# Patient Record
(demographics unavailable — no encounter records)

---

## 2025-02-17 NOTE — PHYSICAL EXAM
[Restricted in physically strenuous activity but ambulatory and able to carry out work of a light or sedentary nature] : Status 1- Restricted in physically strenuous activity but ambulatory and able to carry out work of a light or sedentary nature, e.g., light house work, office work [Normal] : affect appropriate [de-identified] : + scleral icterus b/l

## 2025-02-17 NOTE — PHYSICAL EXAM
[Restricted in physically strenuous activity but ambulatory and able to carry out work of a light or sedentary nature] : Status 1- Restricted in physically strenuous activity but ambulatory and able to carry out work of a light or sedentary nature, e.g., light house work, office work [Normal] : affect appropriate [de-identified] : + scleral icterus b/l

## 2025-02-17 NOTE — HISTORY OF PRESENT ILLNESS
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   Kathleen presented to [place] ED 1/22/24 c/o generalized weakness, chest heaviness, jaundice and orange urine for about a week. Labs significant for hyperbilirubinemia, transaminitis. CT AP showed numerous new low-attenuation liver lesions most compatible with metastatic disease. Intrahepatic and extra hepatic biliary ductal dilatation to the level of the ampulla where there is abrupt tapering, as well as, pancreatic ductal dilatation up to the level of the ampulla. Distended gallbladder with pericholecystic edema. Pattern of biliary ductal dilatation raises concern for ampullary or biliary mass or malignant stricture in the setting of liver metastases. Enlarged retroperitoneal lymph node just below the takeoff of the SMA and adjacent to the third portion of the duodenum measuring up to 2.2 x 1.7 cm, presumably metastatic. Case reviewed with GI, patient discharged with plans to have EUS/ERCP outpatient. Procedure performed 1/30/25 which showed malignant appearing ampullary mass with biliary obstruction. Mass was biopsied, metal stent placed. Final pathology showed ampullary mass moderately to poorly differentiated adenocarcinoma w/ intestinal, pancreaticobiliary and neuroendocrine differentiation. Liver mass biopsy positive for adenocarcinoma mucinous, papillary and signet ring cell features. 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   Referred to Medical Oncology for initial consultation.   Social: lives with her , daughter and two sons. Has 8 kids total (3 in [facility], rest here). 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  She has been following with various doctors closely. Has been c/o L sided abdominal pain for years. Had a CT A/P with co in 2022 and then had another one 1 yr ago but was a contrast study.      [de-identified] : mod-poorly differentiated adeno intestinal, pancreaticobiliary, and NET differentiation [de-identified] : her 2 neg  MMR intact Liver bx: adeno with mucinous, papillary, signet ring cell features CPS 4  [FreeTextEntry1] : initial visit  [de-identified] : Here with her daughter, Claudia.  Has had abdominal pain for years. Per report, all work up has been negative. Worse postprandial. Does not take PPI.  Was in [facility] end of last year. Developed low appetite, lethargic. When she came back from [facility], felt like her heart was racing. Saw Cardiologist, CXR done showing PNA? Treated with abx x 4 days. Developed puritus, called dermatologist. Dermatologist noticed eyes were jaundice. Sent her to ED.  C/o blurry vision since day after ERCP, 1/31/25.  Lost ~5 lbs since January.  Intermittent nausea, no vomiting  Bowels are normal.  chronic neuropathy worse in feet, mild in hands. does not fall, occasionally unsteady if moves too fast + LE edema since the hospital, unchanged.  lives with , 8 kids, 4 boys, 4 girls, 25 grandkids   EGD 12/15/24

## 2025-02-17 NOTE — REVIEW OF SYSTEMS
[Fatigue] : fatigue [Recent Change In Weight] : ~T recent weight change [Chest Pain] : chest pain [Lower Ext Edema] : lower extremity edema [Abdominal Pain] : abdominal pain [Diarrhea: Grade 0] : Diarrhea: Grade 0 [Negative] : Allergic/Immunologic [FreeTextEntry3] : icteric sclera  [FreeTextEntry7] : + nausea [de-identified] : + peripheral neuropathy

## 2025-02-17 NOTE — REASON FOR VISIT
[Initial Consultation] : an initial consultation [Family Member] : family member [FreeTextEntry2] : Stage IV ampullary adenocarcinoma

## 2025-02-17 NOTE — HISTORY OF PRESENT ILLNESS
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   Kathleen presented to [place] ED 1/22/24 c/o generalized weakness, chest heaviness, jaundice and orange urine for about a week. Labs significant for hyperbilirubinemia, transaminitis. CT AP showed numerous new low-attenuation liver lesions most compatible with metastatic disease. Intrahepatic and extra hepatic biliary ductal dilatation to the level of the ampulla where there is abrupt tapering, as well as, pancreatic ductal dilatation up to the level of the ampulla. Distended gallbladder with pericholecystic edema. Pattern of biliary ductal dilatation raises concern for ampullary or biliary mass or malignant stricture in the setting of liver metastases. Enlarged retroperitoneal lymph node just below the takeoff of the SMA and adjacent to the third portion of the duodenum measuring up to 2.2 x 1.7 cm, presumably metastatic. Case reviewed with GI, patient discharged with plans to have EUS/ERCP outpatient. Procedure performed 1/30/25 which showed malignant appearing ampullary mass with biliary obstruction. Mass was biopsied, metal stent placed. Final pathology showed ampullary mass moderately to poorly differentiated adenocarcinoma w/ intestinal, pancreaticobiliary and neuroendocrine differentiation. Liver mass biopsy positive for adenocarcinoma mucinous, papillary and signet ring cell features. 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   Referred to Medical Oncology for initial consultation.   Social: lives with her , daughter and two sons. Has 8 kids total (3 in [facility], rest here). 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  She has been following with various doctors closely. Has been c/o L sided abdominal pain for years. Had a CT A/P with co in 2022 and then had another one 1 yr ago but was a contrast study.      [de-identified] : mod-poorly differentiated adeno intestinal, pancreaticobiliary, and NET differentiation [de-identified] : her 2 neg  MMR intact Liver bx: adeno with mucinous, papillary, signet ring cell features CPS 4  [FreeTextEntry1] : initial visit  [de-identified] : Here with her daughter, Claudia.  Has had abdominal pain for years. Per report, all work up has been negative. Worse postprandial. Does not take PPI.  Was in [facility] end of last year. Developed low appetite, lethargic. When she came back from [facility], felt like her heart was racing. Saw Cardiologist, CXR done showing PNA? Treated with abx x 4 days. Developed puritus, called dermatologist. Dermatologist noticed eyes were jaundice. Sent her to ED.  C/o blurry vision since day after ERCP, 1/31/25.  Lost ~5 lbs since January.  Intermittent nausea, no vomiting  Bowels are normal.  chronic neuropathy worse in feet, mild in hands. does not fall, occasionally unsteady if moves too fast + LE edema since the hospital, unchanged.  lives with , 8 kids, 4 boys, 4 girls, 25 grandkids   EGD 12/15/24

## 2025-02-17 NOTE — REVIEW OF SYSTEMS
[Fatigue] : fatigue [Recent Change In Weight] : ~T recent weight change [Chest Pain] : chest pain [Lower Ext Edema] : lower extremity edema [Abdominal Pain] : abdominal pain [Diarrhea: Grade 0] : Diarrhea: Grade 0 [Negative] : Allergic/Immunologic [FreeTextEntry3] : icteric sclera  [FreeTextEntry7] : + nausea [de-identified] : + peripheral neuropathy

## 2025-02-17 NOTE — HISTORY OF PRESENT ILLNESS
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   Kathleen presented to [place] ED 1/22/24 c/o generalized weakness, chest heaviness, jaundice and orange urine for about a week. Labs significant for hyperbilirubinemia, transaminitis. CT AP showed numerous new low-attenuation liver lesions most compatible with metastatic disease. Intrahepatic and extra hepatic biliary ductal dilatation to the level of the ampulla where there is abrupt tapering, as well as, pancreatic ductal dilatation up to the level of the ampulla. Distended gallbladder with pericholecystic edema. Pattern of biliary ductal dilatation raises concern for ampullary or biliary mass or malignant stricture in the setting of liver metastases. Enlarged retroperitoneal lymph node just below the takeoff of the SMA and adjacent to the third portion of the duodenum measuring up to 2.2 x 1.7 cm, presumably metastatic. Case reviewed with GI, patient discharged with plans to have EUS/ERCP outpatient. Procedure performed 1/30/25 which showed malignant appearing ampullary mass with biliary obstruction. Mass was biopsied, metal stent placed. Final pathology showed ampullary mass moderately to poorly differentiated adenocarcinoma w/ intestinal, pancreaticobiliary and neuroendocrine differentiation. Liver mass biopsy positive for adenocarcinoma mucinous, papillary and signet ring cell features. 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   Referred to Medical Oncology for initial consultation.   Social: lives with her , daughter and two sons. Has 8 kids total (3 in [facility], rest here). 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  She has been following with various doctors closely. Has been c/o L sided abdominal pain for years. Had a CT A/P with co in 2022 and then had another one 1 yr ago but was a contrast study.      [de-identified] : mod-poorly differentiated adeno intestinal, pancreaticobiliary, and NET differentiation [de-identified] : her 2 neg  MMR intact Liver bx: adeno with mucinous, papillary, signet ring cell features CPS 4  [FreeTextEntry1] : initial visit  [de-identified] : Here with her daughter, Claudia.  Has had abdominal pain for years. Per report, all work up has been negative. Worse postprandial. Does not take PPI.  Was in [facility] end of last year. Developed low appetite, lethargic. When she came back from [facility], felt like her heart was racing. Saw Cardiologist, CXR done showing PNA? Treated with abx x 4 days. Developed puritus, called dermatologist. Dermatologist noticed eyes were jaundice. Sent her to ED.  C/o blurry vision since day after ERCP, 1/31/25.  Lost ~5 lbs since January.  Intermittent nausea, no vomiting  Bowels are normal.  chronic neuropathy worse in feet, mild in hands. does not fall, occasionally unsteady if moves too fast + LE edema since the hospital, unchanged.  lives with , 8 kids, 4 boys, 4 girls, 25 grandkids   EGD 12/15/24

## 2025-02-17 NOTE — PHYSICAL EXAM
[Restricted in physically strenuous activity but ambulatory and able to carry out work of a light or sedentary nature] : Status 1- Restricted in physically strenuous activity but ambulatory and able to carry out work of a light or sedentary nature, e.g., light house work, office work [Normal] : affect appropriate [de-identified] : + scleral icterus b/l

## 2025-02-17 NOTE — REVIEW OF SYSTEMS
[Fatigue] : fatigue [Recent Change In Weight] : ~T recent weight change [Chest Pain] : chest pain [Lower Ext Edema] : lower extremity edema [Abdominal Pain] : abdominal pain [Diarrhea: Grade 0] : Diarrhea: Grade 0 [Negative] : Allergic/Immunologic [FreeTextEntry3] : icteric sclera  [FreeTextEntry7] : + nausea [de-identified] : + peripheral neuropathy

## 2025-03-21 NOTE — BEGINNING OF VISIT
[0] : 2) Feeling down, depressed, or hopeless: Not at all (0) [PHQ-2 Negative] : PHQ-2 Negative [PHQ-9 Deferred] : PHQ-9 Deferred [Pain Scale: ___] : On a scale of 1-10, today the patient's pain is a(n) [unfilled]. [Never] : Never [Reviewed, no changes] : Reviewed, no changes [Abdominal Pain] : abdominal pain [Vomiting] : no vomiting [Constipation] : no constipation [Diarrhea Character] : Diarrhea: Grade 0

## 2025-03-24 NOTE — REVIEW OF SYSTEMS
[Fatigue] : fatigue [Lower Ext Edema] : lower extremity edema [Abdominal Pain] : abdominal pain [Constipation] : constipation [Diarrhea: Grade 0] : Diarrhea: Grade 0 [Insomnia] : insomnia [Negative] : Allergic/Immunologic [Recent Change In Weight] : ~T no recent weight change [FreeTextEntry7] : + nausea [de-identified] : + peripheral neuropathy

## 2025-03-24 NOTE — REVIEW OF SYSTEMS
[Fatigue] : fatigue [Lower Ext Edema] : lower extremity edema [Abdominal Pain] : abdominal pain [Constipation] : constipation [Diarrhea: Grade 0] : Diarrhea: Grade 0 [Insomnia] : insomnia [Negative] : Allergic/Immunologic [Recent Change In Weight] : ~T no recent weight change [FreeTextEntry7] : + nausea [de-identified] : + peripheral neuropathy Syncope, unspecified syncope type

## 2025-03-24 NOTE — HISTORY OF PRESENT ILLNESS
[Disease: _____________________] : Disease: [unfilled] [M: ___] : M[unfilled] [AJCC Stage: ____] : AJCC Stage: [unfilled] [Therapy: ___] : Therapy: [unfilled] [Cycle: ___] : Cycle: [unfilled] [Day: ___] : Day: [unfilled] [Date: ____________] : Patient's last distress assessment performed on [unfilled]. 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   Kathleen presented to [place] ED 1/22/24 c/o generalized weakness, chest heaviness, jaundice and orange urine for about a week. Labs significant for hyperbilirubinemia, transaminitis. CT AP showed numerous new low-attenuation liver lesions most compatible with metastatic disease. Intrahepatic and extra hepatic biliary ductal dilatation to the level of the ampulla where there is abrupt tapering, as well as, pancreatic ductal dilatation up to the level of the ampulla. Distended gallbladder with pericholecystic edema. Pattern of biliary ductal dilatation raises concern for ampullary or biliary mass or malignant stricture in the setting of liver metastases. Enlarged retroperitoneal lymph node just below the takeoff of the SMA and adjacent to the third portion of the duodenum measuring up to 2.2 x 1.7 cm, presumably metastatic. Case reviewed with GI, patient discharged with plans to have EUS/ERCP outpatient. Procedure performed 1/30/25 which showed malignant appearing ampullary mass with biliary obstruction. Mass was biopsied, metal stent placed. Final pathology showed ampullary mass moderately to poorly differentiated adenocarcinoma w/ intestinal, pancreaticobiliary and neuroendocrine differentiation. Liver mass biopsy positive for adenocarcinoma mucinous, papillary and signet ring cell features. 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   Referred to Medical Oncology for initial consultation.   Social: lives with her , daughter and two sons. Has 8 kids total (3 in [facility], rest here). 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  She has been following with various doctors closely. Has been c/o L sided abdominal pain for years. Had a CT A/P with co in 2022 and then had another one 1 yr ago but was a contrast study.   2/22/25: CT Chest: 3 mm solid nodule right lower lobe, 6 x 3 mm subpleural nodule right apex. Multiple bilobed hepatic lesions consistent with metastases 3/10/25: C1 FOLFOX      [de-identified] : mod-poorly differentiated adeno intestinal, pancreaticobiliary, and NET differentiation [de-identified] : her 2 neg  MMR intact Liver bx: adeno with mucinous, papillary, signet ring cell features CPS 4  [de-identified] : Here with one of her daughters  Had nausea after pump came off, alleviated with reglan. Does not have dexamethasone at home  +constipation  +neuropathy: about the same from baseline.  +low appetite +ongoing left-sided, chronic, abdominal pain. Does appear to get worse with eating  +chronic back & shoulder pain  +insomnia, Cardiologist rx'd ambien

## 2025-03-24 NOTE — HISTORY OF PRESENT ILLNESS
[Disease: _____________________] : Disease: [unfilled] [M: ___] : M[unfilled] [AJCC Stage: ____] : AJCC Stage: [unfilled] [Therapy: ___] : Therapy: [unfilled] [Cycle: ___] : Cycle: [unfilled] [Day: ___] : Day: [unfilled] [Date: ____________] : Patient's last distress assessment performed on [unfilled]. 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   Kathleen presented to [place] ED 1/22/24 c/o generalized weakness, chest heaviness, jaundice and orange urine for about a week. Labs significant for hyperbilirubinemia, transaminitis. CT AP showed numerous new low-attenuation liver lesions most compatible with metastatic disease. Intrahepatic and extra hepatic biliary ductal dilatation to the level of the ampulla where there is abrupt tapering, as well as, pancreatic ductal dilatation up to the level of the ampulla. Distended gallbladder with pericholecystic edema. Pattern of biliary ductal dilatation raises concern for ampullary or biliary mass or malignant stricture in the setting of liver metastases. Enlarged retroperitoneal lymph node just below the takeoff of the SMA and adjacent to the third portion of the duodenum measuring up to 2.2 x 1.7 cm, presumably metastatic. Case reviewed with GI, patient discharged with plans to have EUS/ERCP outpatient. Procedure performed 1/30/25 which showed malignant appearing ampullary mass with biliary obstruction. Mass was biopsied, metal stent placed. Final pathology showed ampullary mass moderately to poorly differentiated adenocarcinoma w/ intestinal, pancreaticobiliary and neuroendocrine differentiation. Liver mass biopsy positive for adenocarcinoma mucinous, papillary and signet ring cell features. 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   Referred to Medical Oncology for initial consultation.   Social: lives with her , daughter and two sons. Has 8 kids total (3 in [facility], rest here). 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  She has been following with various doctors closely. Has been c/o L sided abdominal pain for years. Had a CT A/P with co in 2022 and then had another one 1 yr ago but was a contrast study.   2/22/25: CT Chest: 3 mm solid nodule right lower lobe, 6 x 3 mm subpleural nodule right apex. Multiple bilobed hepatic lesions consistent with metastases 3/10/25: C1 FOLFOX      [de-identified] : mod-poorly differentiated adeno intestinal, pancreaticobiliary, and NET differentiation [de-identified] : her 2 neg  MMR intact Liver bx: adeno with mucinous, papillary, signet ring cell features CPS 4  [de-identified] : Here with one of her daughters  Had nausea after pump came off, alleviated with reglan. Does not have dexamethasone at home  +constipation  +neuropathy: about the same from baseline.  +low appetite +ongoing left-sided, chronic, abdominal pain. Does appear to get worse with eating  +chronic back & shoulder pain  +insomnia, Cardiologist rx'd ambien

## 2025-03-24 NOTE — HISTORY OF PRESENT ILLNESS
[Disease: _____________________] : Disease: [unfilled] [M: ___] : M[unfilled] [AJCC Stage: ____] : AJCC Stage: [unfilled] [Therapy: ___] : Therapy: [unfilled] [Cycle: ___] : Cycle: [unfilled] [Day: ___] : Day: [unfilled] [Date: ____________] : Patient's last distress assessment performed on [unfilled]. 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   Kathleen presented to [place] ED 1/22/24 c/o generalized weakness, chest heaviness, jaundice and orange urine for about a week. Labs significant for hyperbilirubinemia, transaminitis. CT AP showed numerous new low-attenuation liver lesions most compatible with metastatic disease. Intrahepatic and extra hepatic biliary ductal dilatation to the level of the ampulla where there is abrupt tapering, as well as, pancreatic ductal dilatation up to the level of the ampulla. Distended gallbladder with pericholecystic edema. Pattern of biliary ductal dilatation raises concern for ampullary or biliary mass or malignant stricture in the setting of liver metastases. Enlarged retroperitoneal lymph node just below the takeoff of the SMA and adjacent to the third portion of the duodenum measuring up to 2.2 x 1.7 cm, presumably metastatic. Case reviewed with GI, patient discharged with plans to have EUS/ERCP outpatient. Procedure performed 1/30/25 which showed malignant appearing ampullary mass with biliary obstruction. Mass was biopsied, metal stent placed. Final pathology showed ampullary mass moderately to poorly differentiated adenocarcinoma w/ intestinal, pancreaticobiliary and neuroendocrine differentiation. Liver mass biopsy positive for adenocarcinoma mucinous, papillary and signet ring cell features. 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   Referred to Medical Oncology for initial consultation.   Social: lives with her , daughter and two sons. Has 8 kids total (3 in [facility], rest here). 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  She has been following with various doctors closely. Has been c/o L sided abdominal pain for years. Had a CT A/P with co in 2022 and then had another one 1 yr ago but was a contrast study.   2/22/25: CT Chest: 3 mm solid nodule right lower lobe, 6 x 3 mm subpleural nodule right apex. Multiple bilobed hepatic lesions consistent with metastases 3/10/25: C1 FOLFOX      [de-identified] : mod-poorly differentiated adeno intestinal, pancreaticobiliary, and NET differentiation [de-identified] : her 2 neg  MMR intact Liver bx: adeno with mucinous, papillary, signet ring cell features CPS 4  [de-identified] : Here with one of her daughters  Had nausea after pump came off, alleviated with reglan. Does not have dexamethasone at home  +constipation  +neuropathy: about the same from baseline.  +low appetite +ongoing left-sided, chronic, abdominal pain. Does appear to get worse with eating  +chronic back & shoulder pain  +insomnia, Cardiologist rx'd ambien

## 2025-03-24 NOTE — REASON FOR VISIT
[Follow-Up Visit] : a follow-up [Family Member] : family member [FreeTextEntry2] : Stage IV ampullary adenocarcinoma

## 2025-03-24 NOTE — REVIEW OF SYSTEMS
[Fatigue] : fatigue [Lower Ext Edema] : lower extremity edema [Abdominal Pain] : abdominal pain [Constipation] : constipation [Diarrhea: Grade 0] : Diarrhea: Grade 0 [Insomnia] : insomnia [Negative] : Allergic/Immunologic [Recent Change In Weight] : ~T no recent weight change [FreeTextEntry7] : + nausea [de-identified] : + peripheral neuropathy

## 2025-04-03 NOTE — HISTORY OF PRESENT ILLNESS
[Disease: _____________________] : Disease: [unfilled] [M: ___] : M[unfilled] [AJCC Stage: ____] : AJCC Stage: [unfilled] [Therapy: ___] : Therapy: [unfilled] [Cycle: ___] : Cycle: [unfilled] [Day: ___] : Day: [unfilled] [Date: ____________] : Patient's last distress assessment performed on [unfilled]. 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   Kathleen presented to [facility] 1/22/24 c/o generalized weakness, chest heaviness, jaundice and orange urine for about a week. Labs significant for hyperbilirubinemia, transaminitis. CT AP showed numerous new low-attenuation liver lesions most compatible with metastatic disease. Intrahepatic and extra hepatic biliary ductal dilatation to the level of the ampulla where there is abrupt tapering, as well as, pancreatic ductal dilatation up to the level of the ampulla. Distended gallbladder with pericholecystic edema. Pattern of biliary ductal dilatation raises concern for ampullary or biliary mass or malignant stricture in the setting of liver metastases. Enlarged retroperitoneal lymph node just below the takeoff of the SMA and adjacent to the third portion of the duodenum measuring up to 2.2 x 1.7 cm, presumably metastatic. Case reviewed with GI, patient discharged with plans to have EUS/ERCP outpatient. Procedure performed 1/30/25 which showed malignant appearing ampullary mass with biliary obstruction. Mass was biopsied, metal stent placed. Final pathology showed ampullary mass moderately to poorly differentiated adenocarcinoma w/ intestinal, pancreaticobiliary and neuroendocrine differentiation. Liver mass biopsy positive for adenocarcinoma mucinous, papillary and signet ring cell features. 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   Referred to Medical Oncology for initial consultation.   Social: lives with her , daughter and two sons. Has 8 kids total (3 in [facility], rest here). 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  She has been following with various doctors closely. Has been c/o L sided abdominal pain for years. Had a CT A/P with co in 2022 and then had another one 1 yr ago but was a contrast study.   2/22/25: CT Chest: 3 mm solid nodule right lower lobe, 6 x 3 mm subpleural nodule right apex. Multiple bilobed hepatic lesions consistent with metastases 3/10/25: C1 FOLFOX  3/24/25: C2    [de-identified] : mod-poorly differentiated adeno intestinal, pancreaticobiliary, and NET differentiation [de-identified] : her 2 neg  MMR intact Liver bx: adeno with mucinous, papillary, signet ring cell features CPS 4  [de-identified] : after C2, noted worsening neuropathy in hands (tingling) and feet (numbness).  having issues with balance, almost fell last night when getting out of bed. is asking if can restart Lyrica BID, has an apt with physiatry in 2 weeks lost 2 lbs since last visit. appetite is not as good. ate egg whites today  has been taking vitamins daily (multivitamins)

## 2025-04-03 NOTE — REVIEW OF SYSTEMS
[Diarrhea: Grade 0] : Diarrhea: Grade 0 [Recent Change In Weight] : ~T recent weight change [Negative] : Psychiatric [de-identified] : + peripheral neuropathy

## 2025-04-16 NOTE — PHYSICAL EXAM
[FreeTextEntry1] : Gen: Patient is awake and alert and oriented x3, NAD HEENT: EOMI, hearing grossly normal Resp: No labored breathing CV: Equal chest excursions GI: Non-distended Skin: No rashes, erythema Lymph: No clubbing, cyanosis, edema Palpation: No tenderness to palpation Sensation: Decreased to light touch in hands and feet Strength: 4/5 throughout Gait: Difficulty with tandem. Able to go from sit to stand with mild difficulty.

## 2025-04-16 NOTE — DATA REVIEWED
[FreeTextEntry1] : 4/2025 labs: WBC: 4.88 Hgb: 12.4 Plt: 160 Neutrophil #: 2.58 Creatinine: 0.57 AST: 20 ALT: 17

## 2025-04-16 NOTE — HISTORY OF PRESENT ILLNESS
[FreeTextEntry1] : Ms. Kathleen Armendariz is a 70-year-old female with history of HTN, T2DM, A-fib on Eliquis and ampullary cancer s/p EUS/ERCP with metal stent placement on 1/30/25. Started on FOLFOX on 3/10/25. She was referred by Dr. Melba Lozada for assistance with neuropathy.   I had Sola, who is a medical student, shadowing me today.   She has neuropathy in hand and feet which is affecting her balance. She was restarted on Lyrica 50 mg BID by oncology which helps. She cannot try a higher dose due lethargy. Denies trying topical medications. She hasn't tried therapy. She will try outpatient therapy but if it is too far, that she would like to try home therapy.   Endorses a poor appetite but weight is overall stable. She sees nutrition. Declined medical marijuana.   [place] wants [place] go back to [place] home [place]. Her  is currently still there.   She takes Mirtazapine at night.

## 2025-04-21 NOTE — HISTORY OF PRESENT ILLNESS
[Disease: _____________________] : Disease: [unfilled] [M: ___] : M[unfilled] [AJCC Stage: ____] : AJCC Stage: [unfilled] [Therapy: ___] : Therapy: [unfilled] [Cycle: ___] : Cycle: [unfilled] [Day: ___] : Day: [unfilled] [Date: ____________] : Patient's last distress assessment performed on [unfilled]. 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   Kathleen presented to [facility] ED 1/22/24 c/o generalized weakness, chest heaviness, jaundice and orange urine for about a week. Labs significant for hyperbilirubinemia, transaminitis. CT AP showed numerous new low-attenuation liver lesions most compatible with metastatic disease. Intrahepatic and extra hepatic biliary ductal dilatation to the level of the ampulla where there is abrupt tapering, as well as, pancreatic ductal dilatation up to the level of the ampulla. Distended gallbladder with pericholecystic edema. Pattern of biliary ductal dilatation raises concern for ampullary or biliary mass or malignant stricture in the setting of liver metastases. Enlarged retroperitoneal lymph node just below the takeoff of the SMA and adjacent to the third portion of the duodenum measuring up to 2.2 x 1.7 cm, presumably metastatic. Case reviewed with GI, patient discharged with plans to have EUS/ERCP outpatient. Procedure performed 1/30/25 which showed malignant appearing ampullary mass with biliary obstruction. Mass was biopsied, metal stent placed. Final pathology showed ampullary mass moderately to poorly differentiated adenocarcinoma w/ intestinal, pancreaticobiliary and neuroendocrine differentiation. Liver mass biopsy positive for adenocarcinoma mucinous, papillary and signet ring cell features. 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   Referred to Medical Oncology for initial consultation.   Social: lives with her , daughter and two sons. Has 8 kids total (3 in [facility], rest here). 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  She has been following with various doctors closely. Has been c/o L sided abdominal pain for years. Had a CT A/P with co in 2022 and then had another one 1 yr ago but was a contrast study.   2/22/25: CT Chest: 3 mm solid nodule right lower lobe, 6 x 3 mm subpleural nodule right apex. Multiple bilobed hepatic lesions consistent with metastases 3/10/25: C1 FOLFOX  3/24/25: C2 4/7/25: C3  4/21/25: C4, 5FU/LV only     [de-identified] : mod-poorly differentiated adeno intestinal, pancreaticobiliary, and NET differentiation [de-identified] : her 2 neg  MMR intact Liver bx: adeno with mucinous, papillary, signet ring cell features CPS 4  [de-identified] : Seen prior to treatment  Neuropathy very bothersome in her hands/arms and feet Continues to feel like pain/tingling in her upper extremities and numbness in her lower extremities.  Eating better Isn't sleeping well. Doesn't want to take mirtazapine

## 2025-04-21 NOTE — REVIEW OF SYSTEMS
[Abdominal Pain] : abdominal pain [Diarrhea: Grade 0] : Diarrhea: Grade 0 [Negative] : Allergic/Immunologic [de-identified] : + peripheral neuropathy

## 2025-04-28 NOTE — HISTORY OF PRESENT ILLNESS
[Disease: _____________________] : Disease: [unfilled] [M: ___] : M[unfilled] [AJCC Stage: ____] : AJCC Stage: [unfilled] [Therapy: ___] : Therapy: [unfilled] [Date: ____________] : Patient's last distress assessment performed on [unfilled]. 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   Kathleen presented to [facility] ED 1/22/24 c/o generalized weakness, chest heaviness, jaundice and orange urine for about a week. Labs significant for hyperbilirubinemia, transaminitis. CT AP showed numerous new low-attenuation liver lesions most compatible with metastatic disease. Intrahepatic and extra hepatic biliary ductal dilatation to the level of the ampulla where there is abrupt tapering, as well as, pancreatic ductal dilatation up to the level of the ampulla. Distended gallbladder with pericholecystic edema. Pattern of biliary ductal dilatation raises concern for ampullary or biliary mass or malignant stricture in the setting of liver metastases. Enlarged retroperitoneal lymph node just below the takeoff of the SMA and adjacent to the third portion of the duodenum measuring up to 2.2 x 1.7 cm, presumably metastatic. Case reviewed with GI, patient discharged with plans to have EUS/ERCP outpatient. Procedure performed 1/30/25 which showed malignant appearing ampullary mass with biliary obstruction. Mass was biopsied, metal stent placed. Final pathology showed ampullary mass moderately to poorly differentiated adenocarcinoma w/ intestinal, pancreaticobiliary and neuroendocrine differentiation. Liver mass biopsy positive for adenocarcinoma mucinous, papillary and signet ring cell features. 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   Referred to Medical Oncology for initial consultation.   Social: lives with her , daughter and two sons. Has 8 kids total (3 in [facility], rest here). 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  She has been following with various doctors closely. Has been c/o L sided abdominal pain for years. Had a CT A/P with co in 2022 and then had another one 1 yr ago but was a contrast study.   2/22/25: CT Chest: 3 mm solid nodule right lower lobe, 6 x 3 mm subpleural nodule right apex. Multiple bilobed hepatic lesions consistent with metastases 3/10/25: C1 FOLFOX  3/24/25: C2 4/7/25: C3  4/21/25: C4, 5FU/LV only  4/23/25: CT CAP: Hepatic metastases, increased in size. Retroperitoneal lymphadenopathy, not significantly changed.    [de-identified] : mod-poorly differentiated adeno intestinal, pancreaticobiliary, and NET differentiation [de-identified] : her 2 neg  MMR intact Liver bx: adeno with mucinous, papillary, signet ring cell features CPS 4  [FreeTextEntry1] : Oxaliplatin held last cycle  [de-identified] : here for f/u and review of recent scans. the neuropathy in feet is stable, but bothersome.

## 2025-04-28 NOTE — HISTORY OF PRESENT ILLNESS
[Disease: _____________________] : Disease: [unfilled] [M: ___] : M[unfilled] [AJCC Stage: ____] : AJCC Stage: [unfilled] [Therapy: ___] : Therapy: [unfilled] [Date: ____________] : Patient's last distress assessment performed on [unfilled]. 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   Kathleen presented to [place] ED 1/22/24 c/o generalized weakness, chest heaviness, jaundice and orange urine for about a week. Labs significant for hyperbilirubinemia, transaminitis. CT AP showed numerous new low-attenuation liver lesions most compatible with metastatic disease. Intrahepatic and extra hepatic biliary ductal dilatation to the level of the ampulla where there is abrupt tapering, as well as, pancreatic ductal dilatation up to the level of the ampulla. Distended gallbladder with pericholecystic edema. Pattern of biliary ductal dilatation raises concern for ampullary or biliary mass or malignant stricture in the setting of liver metastases. Enlarged retroperitoneal lymph node just below the takeoff of the SMA and adjacent to the third portion of the duodenum measuring up to 2.2 x 1.7 cm, presumably metastatic. Case reviewed with GI, patient discharged with plans to have EUS/ERCP outpatient. Procedure performed 1/30/25 which showed malignant appearing ampullary mass with biliary obstruction. Mass was biopsied, metal stent placed. Final pathology showed ampullary mass moderately to poorly differentiated adenocarcinoma w/ intestinal, pancreaticobiliary and neuroendocrine differentiation. Liver mass biopsy positive for adenocarcinoma mucinous, papillary and signet ring cell features. 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   Referred to Medical Oncology for initial consultation.   Social: lives with her , daughter and two sons. Has 8 kids total (3 in [facility], rest here). 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  She has been following with various doctors closely. Has been c/o L sided abdominal pain for years. Had a CT A/P with co in 2022 and then had another one 1 yr ago but was a contrast study.   2/22/25: CT Chest: 3 mm solid nodule right lower lobe, 6 x 3 mm subpleural nodule right apex. Multiple bilobed hepatic lesions consistent with metastases 3/10/25: C1 FOLFOX  3/24/25: C2 4/7/25: C3  4/21/25: C4, 5FU/LV only  4/23/25: CT CAP: Hepatic metastases, increased in size. Retroperitoneal lymphadenopathy, not significantly changed.    [de-identified] : mod-poorly differentiated adeno intestinal, pancreaticobiliary, and NET differentiation [de-identified] : her 2 neg  MMR intact Liver bx: adeno with mucinous, papillary, signet ring cell features CPS 4  [FreeTextEntry1] : Oxaliplatin held last cycle  [de-identified] : here for f/u and review of recent scans. the neuropathy in feet is stable, but bothersome.

## 2025-04-28 NOTE — REVIEW OF SYSTEMS
[Abdominal Pain] : abdominal pain [Diarrhea: Grade 0] : Diarrhea: Grade 0 [Negative] : Allergic/Immunologic [de-identified] : + peripheral neuropathy

## 2025-04-28 NOTE — REVIEW OF SYSTEMS
[Abdominal Pain] : abdominal pain [Diarrhea: Grade 0] : Diarrhea: Grade 0 [Negative] : Allergic/Immunologic [de-identified] : + peripheral neuropathy

## 2025-05-02 NOTE — PHYSICAL EXAM
[No Acute Distress] : no acute distress [Well Nourished] : well nourished [No JVD] : no jugular venous distention [No Lymphadenopathy] : no lymphadenopathy [No Respiratory Distress] : no respiratory distress  [No Accessory Muscle Use] : no accessory muscle use [Normal Rate] : normal rate  [Regular Rhythm] : with a regular rhythm [Soft] : abdomen soft [No CVA Tenderness] : no CVA  tenderness [Coordination Grossly Intact] : coordination grossly intact [Normal Insight/Judgement] : insight and judgment were intact [de-identified] : blunted affect

## 2025-05-02 NOTE — HISTORY OF PRESENT ILLNESS
[FreeTextEntry1] : Here to establish care for diabetes management.  Notably, blood glucose on most recent CMP = 327. Currently on 30 units of glargine at night, but she sometimes falls asleep before taking this -- last dose was two days ago.  Also is on a Humalog sliding scale insulin.  Has been noticing her AM blood sugars are very elevated in the 300s.  Was on Farxiga in the past but stopped it because of urinary infections -- last took this in Oct 2024.  Was on Trulicity back in December 2024 -- she took it with her to [facility] but left it there when she came back here because she was diagnosed with ampullary cancer and never returned.  She never started this medication.  Is also getting dexamethasone on days 2 and 3 after chemotherapy -- last got chemotherapy last week Monday.  Has Freestyle He 2 and 3 sensors at home.  She does not know how to use this so   HPI: Reports a lot of upper arm pain in the bilateral shoulders -- worse in the LEFT. The pain is worst when she is reaching or doing any work with her arms.  Reports tingling in the bilateral hands in all the fingers.  Also has numbness in her toes.   Recent medical history: Diagnosed with metastatic Ampullary carcinoma in December of 2024 when she presented to the emergency department with generalized weakness, chest heaviness, jaundice, and orange urine for 1 week.  Labs at that time significant for hyperbilirubinemia, transaminitis. CT AP showed numerous new low-attenuation liver lesions most compatible with metastatic disease. Intrahepatic and extra hepatic biliary ductal dilatation to the level of the ampulla where there is abrupt tapering, as well as, pancreatic ductal dilatation up to the level of the ampulla. Distended gallbladder with pericholecystic edema. Pattern of biliary ductal dilatation raises concern for ampullary or biliary mass or malignant stricture in the setting of liver metastases. Enlarged retroperitoneal lymph node just below the takeoff of the SMA and adjacent to the third portion of the duodenum measuring up to 2.2 x 1.7 cm, presumably metastatic. Had EUS/ERCP completed on 1/30/2025 which showed malignant appearing ampullary mass with biliary obstruction. Mass was biopsied, metal stent placed. Final pathology showed ampullary mass moderately to poorly differentiated adenocarcinoma w/ intestinal, pancreaticobiliary and neuroendocrine differentiation. Liver mass biopsy positive for adenocarcinoma mucinous, papillary and signet ring cell features. 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  Since then, she has been receiving chemotherapy. 3/10/25: C1 FOLFOX 3/24/25: C2 4/7/25: C3 4/21/25: C4, 5FU/LV only 4/23/25: CT CAP: Hepatic metastases, increased in size. Retroperitoneal lymphadenopathy, not significantly changed. Plan now is for FOLFIRI (LEUCOVORIN, FLUOROURACIL, IRINOTECAN) every 2 weeks

## 2025-05-02 NOTE — HEALTH RISK ASSESSMENT
Health Maintenance Due   Topic Date Due   • Diabetes Eye Exam  Never done   • COVID-19 Vaccine (3 - Moderna risk 4-dose series) 05/26/2021   • Diabetes A1C  08/02/2021   • Influenza Vaccine (1) Never done   • DM/CKD Microalbumin  11/30/2021   • DM/CKD GFR  11/30/2021   • Diabetes Foot Exam  11/30/2021   • Medicare Wellness Visit  11/30/2021        Patient is due for topics as listed above but is not proceeding with Immunization(s) Influenza at this time. Patient is going to schedule diabetic eye exam.      MEDICARE WELLNESS VISIT + NOTE    CHIEF COMPLAINT:  Deanna Aguilar presents for her Subsequent Annual Medicare Wellness Visit.   Her additional complaints or concerns are addressed below.     Patient Care Team:  Yoana Kearns NP as PCP - General (Nurse Practitioner)  Other Other as Neurologist  Other Other as Dermatology        Patient Active Problem List   Diagnosis   • Hypertension   • Hyperlipidemia   • Gout   • Osteopenia   • Sinusitis   • Health care maintenance   • Anemia         Past Medical History:   Diagnosis Date   • Allergy    • Essential (primary) hypertension          Past Surgical History:   Procedure Laterality Date   • ----------mammogram----------  11/08/2017   • Colonoscopy diagnostic  01/01/2004    Colonoscopy, Dx         Social History     Tobacco Use   • Smoking status: Never Smoker   • Smokeless tobacco: Never Used   Vaping Use   • Vaping Use: never used   Substance Use Topics   • Alcohol use: No   • Drug use: No     Family History   Adopted: Yes   Problem Relation Age of Onset   • Patient is unaware of any medical problems Mother    • Patient is unaware of any medical problems Father    • Depression Other    • Injuries Other    • Hypertension Maternal Cousin    • Patient is unaware of any medical problems Son    • Patient is unaware of any medical problems Son          Current Outpatient Medications   Medication Sig Dispense Refill   • raloxifene (EVISTA) 60 MG tablet TAKE 1 TABLET BY  MOUTH DAILY 90 tablet 3   • fluticasone (FLONASE) 50 MCG/ACT nasal spray SHAKE LIQUID AND USE 2 SPRAYS IN EACH NOSTRIL DAILY 16 g 10   • levETIRAcetam (KepPRA) 250 MG tablet Take 250 mg by mouth 2 times daily. Take 1 tablet by mouth twice daily     • omeprazole (PRILOSEC) 20 MG capsule Take 1 capsule by mouth daily. 30 capsule 2   • Polyethyl Glycol-Propyl Glycol (SYSTANE OP) Apply to eye as needed.     • B Complex Vitamins (VITAMIN B COMPLEX PO)      • cycloSPORINE (RESTASIS) 0.05 % ophthalmic emulsion Place 1 drop into both eyes 2 times daily.     • allopurinol (ZYLOPRIM) 100 MG tablet Take 100 mg by mouth 2 times daily.     • atenolol (TENORMIN) 50 MG tablet Take 50 mg by mouth daily.     • vitamin - therapeutic multivitamins w/minerals (CENTRUM SILVER,THERA-M) Tab Take 1 tablet by mouth daily.     • Cholecalciferol (VITAMIN D-3) 1000 UNITS Cap Take 1 tablet by mouth daily.      • Niacin, Antihyperlipidemic, 500 MG Tab Take 500 mg by mouth daily.      • simvastatin (ZOCOR) 40 MG tablet Take 40 mg by mouth nightly. One half once a day.     • Lidocaine HCl (magic mouthwash) Suspension SWISH AND SPIT 5MLS FOUR TIMES DAILY BEFORE MEALS AND NIGHTLY     • Blood Glucose Monitoring Suppl (Accu-Chek Lesa Plus) w/Device Kit USE AS DIRECTED 3 TIMES PER WEEK BEFORE A MEAL     • custom magic mouthwash oral suspension Swish and spit 5 mLs 4 times daily (before meals and nightly). 180 mL 1   • nitrofurantoin, macrocrystal-monohydrate, (Macrobid) 100 MG capsule Take 1 capsule by mouth 2 times daily. 14 capsule 0   • benzonatate (TESSALON PERLES) 100 MG capsule Take 1 capsule by mouth 3 times daily as needed for Cough. 30 capsule 0   • methylPREDNISolone (MEDROL DOSEPAK) 4 MG tablet follow package directions 21 tablet 0   • fluocinolone (SYNALAR) 0.01 % topical solution Apply topically 2 times daily as needed.     • blood glucose test strip USE AS DIRECTED 3 TIMES PER WEEK BEFORE A MEAL     • loratadine (CLARITIN) 10 MG tablet  [Retired] : Retired Take 10 mg by mouth daily.       No current facility-administered medications for this visit.        The following items on the Medicare Health Risk Assessment were found to be positive  6 a.) How many servings of Fruits and Vegetables do you have each day ( 1 serving = 1 piece of fruit, 1/2 cup fruits or vegetables): 1 per day     6 b.) How many servings of High Fiber / Whole Grain Foods to you have each day ( 1 serving = 1 cup cold cereal, 1/2 cup cooked cereal, 1 slice bread): 1 per day     6 d.) How many servings of Sugar Sweetened Beverages do you have each day ( 1 serving = 1 can or 12 oz cup of sode or juice): 2 per day     7c.) Do you worry about falling?: Yes     11a.) Bladder Control problems (urine leaking): Often     11h.) Problems with your hearing: Often         Vision and Hearing screens: Not performed    Advance Directive:   The patient has the following documents:  Power of  for Health Care    Cognitive/Functional Status: no evidence of cognitive dysfunction by direct observation    Opioid Review: Deanna is not taking opioid medications.    Recent PHQ 2/9 Score:    PHQ 2:  Date Adult PHQ 2 Score Adult PHQ 2 Interpretation   12/1/2021 0 No further screening needed       PHQ 9:       DEPRESSION ASSESSMENT/PLAN:  Depression screening is negative no further plan needed.     Body mass index is 22.24 kg/m².    BMI ASSESSMENT/PLAN:  6 meals a day     See Patient Instructions section.   Return in about 1 year (around 12/1/2022) for Medicare Wellness Visit.      OUTPATIENT PROGRESS NOTE    Subjective   Chief Complaint Medicare Wellness Visit (SUB MWV)      Medications  Medications were reviewed and updated today.    Histories  I have personally reviewed and updated the patient's past medical, past surgical, family and social histories during today's visit.    Review of Systems: The patient DENIES symptoms of:  GENERAL negative  SKIN negative  EYES negative  EARS buzzing in right ear  NOSE  [FreeTextEntry5] : Retired in July 2024. 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  [FreeTextEntry6] : Lives at home with her daughter and her grandson. Her  right now is in [facility].  negative  MOUTH negative  NECK negatibe  BREASTS does self breast exam  CARDIORESP negative  GI negative  VASCULAR claudication at rest   FEMALE urge incontinence  NEURO Negative  PSYCH negative  MUSCULOSKEL negative    Objective   Visit Vitals  /74 (BP Location: LUE - Left upper extremity, Patient Position: Sitting, Cuff Size: Regular)   Pulse 84   Temp 97.2 °F (36.2 °C) (Tympanic)   Ht 5' (1.524 m)   Wt 51.7 kg (113 lb 14.4 oz)   SpO2 100%   BMI 22.24 kg/m²     Physical Exam  General appearance: alert and cooperative  Head: Normocephalic, without obvious abnormality, atraumatic  Eyes: Conjunctivae/sclerae normal. No erythema, edema or exudate.  Ears: normal tympanic membranes and external ear canals both ears  Nose: Nares normal. Septum midline. Mucosa normal. No drainage or sinus tenderness.  Throat: lips, mucosa, and tongue normal; teeth and gums normal  Neck: no adenopathy, no carotid bruit, no JVD, supple, symmetrical, trachea midline and thyroid not enlarged, symmetric, no tenderness/mass/nodules  Back: Back symmetric, no curvature. Range of motion normal. No costovertebral angle tenderness.  Lungs: clear to auscultation bilaterally  Heart: regular rate and rhythm, S1, S2 normal, no murmur, click, rub or gallop  Abdomen: Soft, non-tender; bowel sounds normal; no masses, no hepatosplenomegaly  Extremities: extremities normal, atraumatic, no cyanosis or edema  Pulses: 2+ and symmetric  Skin: Skin color, texture, turgor normal. No rashes or lesions  Lymph nodes: Cervical, supraclavicular, and axillary nodes normal.  Neurologic: Alert and oriented x3, normal strength and tone. Normal symmetric reflexes. Normal coordination and gait  Diabetic Foot Exam Documentation     Normal Bilateral Foot Exam:  Skin integrity is normal. Dorsalis pedis and posterior tibial pulses are present.  Pressure sensation using the [place] monofilament is present.      Assessment & Plan   Diagnoses and associated orders  [Never] : Never for this visit:  1. Medicare annual wellness visit, subsequent  2. Elevated fasting glucose  -     Glycohemoglobin  -     Comprehensive Metabolic Panel  -     Microalbumin Urine Random  -     Urinalysis & Reflex Microscopy With Culture If Indicated  3. Hyperlipidemia, unspecified hyperlipidemia type  -     Lipid Panel With Reflex  4. HTN (hypertension), benign  -     Comprehensive Metabolic Panel  5. Fatigue, unspecified type  -     CBC with Automated Differential  6. Encounter for diabetic foot exam (CMS/[facility])     [No] : No

## 2025-05-06 NOTE — HEALTH RISK ASSESSMENT
[Retired] : Retired [Never] : Never [No] : No [FreeTextEntry5] : Retired in July 2024. 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  [FreeTextEntry6] : Lives at home with her daughter and her grandson. Her  right now is in [facility].

## 2025-05-06 NOTE — PHYSICAL EXAM
[No Acute Distress] : no acute distress [Well Nourished] : well nourished [No JVD] : no jugular venous distention [No Lymphadenopathy] : no lymphadenopathy [No Accessory Muscle Use] : no accessory muscle use [Normal Rate] : normal rate  [Regular Rhythm] : with a regular rhythm [Soft] : abdomen soft [No CVA Tenderness] : no CVA  tenderness [Coordination Grossly Intact] : coordination grossly intact [Normal Insight/Judgement] : insight and judgment were intact [de-identified] : blunted affect [No Respiratory Distress] : no respiratory distress  [Normal Affect] : the affect was normal

## 2025-05-06 NOTE — HEALTH RISK ASSESSMENT
[Retired] : Retired [Never] : Never [No] : No [FreeTextEntry5] : Retired in July 2024. 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  [FreeTextEntry6] : Lives at home with her daughter and her grandson. Her  right now is in [facility].

## 2025-05-06 NOTE — HISTORY OF PRESENT ILLNESS
[FreeTextEntry1] : 70F with a pmhx significant for Afib, DM. HTN and Stage IV ampullary carcinoma diagnosed 12/2024. Current chemotherapy treatment: 3/10/25 - 4/7/25 FOLFOX 4/21/25: 5FU/LV only 4/23/25: CT CAP: Hepatic metastases, increased in size. Retroperitoneal lymphadenopathy not significantly changed. Plan now is for FOLFIRI (LEUCOVORIN, FLUOROURACIL, IRINOTECAN) every 2 weeks  Pt here today for survivorship follow up, Diabetes management.  Patient was seen today in the chemotherapy infusion center while receiving treatment.  She reports missing her earlier appointment today because she had attended a baby shower over the weekend, during which she consumed a significant amount of sweets.  She did not log any blood glucose readings as previously requested.  She has not yet picked up the prescribed glucose sensor device but reports that she plans to do so after completing today's chemotherapy session.  She states that she checked her blood glucose this morning, and the result was 348. She denies any symptoms of hyperglycemia including polydipsia polyuria headaches or blurred vision.  Of note blood glucose on most recent CMP = 305 (5/8/2025) Current DM regimen: Lantus 30 units at night.  Humalog sliding scale insulin.16 units TID with meals   Has been noticing her AM blood sugars are very elevated in the 300s.  Was on Farxiga in the past but stopped it because of urinary infections -- last took this in Oct 2024.  She was prescribed Trulicity in December 2024 -- she took it with her to [facility] but left it there when she came back here because she was diagnosed with ampullary cancer and never returned.  She never started this medication.   Is also getting dexamethasone on days 2 and 3 after chemotherapy.  Has Freestyle He 2 and 3 sensors at home.  She does not know how to use this so    Recent medical history: Diagnosed with metastatic Ampullary carcinoma in December of 2024 when she presented to the emergency department with generalized weakness, chest heaviness, jaundice, and orange urine for 1 week.  Labs at that time significant for hyperbilirubinemia, transaminitis. CT AP showed numerous new low-attenuation liver lesions most compatible with metastatic disease. Intrahepatic and extra hepatic biliary ductal dilatation to the level of the ampulla where there is abrupt tapering, as well as, pancreatic ductal dilatation up to the level of the ampulla. Distended gallbladder with pericholecystic edema. Pattern of biliary ductal dilatation raises concern for ampullary or biliary mass or malignant stricture in the setting of liver metastases. Enlarged retroperitoneal lymph node just below the takeoff of the SMA and adjacent to the third portion of the duodenum measuring up to 2.2 x 1.7 cm, presumably metastatic. Had EUS/ERCP completed on 1/30/2025 which showed malignant appearing ampullary mass with biliary obstruction. Mass was biopsied, metal stent placed. Final pathology showed ampullary mass moderately to poorly differentiated adenocarcinoma w/ intestinal, pancreaticobiliary and neuroendocrine differentiation. Liver mass biopsy positive for adenocarcinoma mucinous, papillary and signet ring cell features. 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

## 2025-05-06 NOTE — REVIEW OF SYSTEMS
[Itching] : no itching [Negative] : Psychiatric [de-identified] : She feels her face is red since chemotherapy yesturday

## 2025-05-06 NOTE — HISTORY OF PRESENT ILLNESS
[FreeTextEntry1] : 70F with a pmhx significant for Afib, DM. HTN and Stage IV ampullary carcinoma diagnosed 12/2024. Current chemotherapy treatment: 3/10/25 - 4/7/25 FOLFOX 4/21/25: 5FU/LV only 4/23/25: CT CAP: Hepatic metastases, increased in size. Retroperitoneal lymphadenopathy not significantly changed. Plan now is for FOLFIRI (LEUCOVORIN, FLUOROURACIL, IRINOTECAN) every 2 weeks  Pt here today for survivorship follow up, Diabetes management.  Patient was seen today in the chemotherapy infusion center while receiving treatment.  She reports missing her earlier appointment today because she had attended a baby shower over the weekend, during which she consumed a significant amount of sweets.  She did not log any blood glucose readings as previously requested.  She has not yet picked up the prescribed glucose sensor device but reports that she plans to do so after completing today's chemotherapy session.  She states that she checked her blood glucose this morning, and the result was 348. She denies any symptoms of hyperglycemia including polydipsia polyuria headaches or blurred vision.  Of note blood glucose on most recent CMP = 305 (5/8/2025) Current DM regimen: Lantus 30 units at night.  Humalog sliding scale insulin.16 units TID with meals   Has been noticing her AM blood sugars are very elevated in the 300s.  Was on Farxiga in the past but stopped it because of urinary infections -- last took this in Oct 2024.  She was prescribed Trulicity in December 2024 -- she took it with her to [facility] but left it there when she came back here because she was diagnosed with ampullary cancer and never returned.  She never started this medication.   Is also getting dexamethasone on days 2 and 3 after chemotherapy.  Has Freestyle He 2 and 3 sensors at home.  She does not know how to use this so    Recent medical history: Diagnosed with metastatic Ampullary carcinoma in December of 2024 when she presented to the emergency department with generalized weakness, chest heaviness, jaundice, and orange urine for 1 week.  Labs at that time significant for hyperbilirubinemia, transaminitis. CT AP showed numerous new low-attenuation liver lesions most compatible with metastatic disease. Intrahepatic and extra hepatic biliary ductal dilatation to the level of the ampulla where there is abrupt tapering, as well as, pancreatic ductal dilatation up to the level of the ampulla. Distended gallbladder with pericholecystic edema. Pattern of biliary ductal dilatation raises concern for ampullary or biliary mass or malignant stricture in the setting of liver metastases. Enlarged retroperitoneal lymph node just below the takeoff of the SMA and adjacent to the third portion of the duodenum measuring up to 2.2 x 1.7 cm, presumably metastatic. Had EUS/ERCP completed on 1/30/2025 which showed malignant appearing ampullary mass with biliary obstruction. Mass was biopsied, metal stent placed. Final pathology showed ampullary mass moderately to poorly differentiated adenocarcinoma w/ intestinal, pancreaticobiliary and neuroendocrine differentiation. Liver mass biopsy positive for adenocarcinoma mucinous, papillary and signet ring cell features. 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

## 2025-05-06 NOTE — PHYSICAL EXAM
[No Acute Distress] : no acute distress [Well Nourished] : well nourished [No JVD] : no jugular venous distention [No Lymphadenopathy] : no lymphadenopathy [No Accessory Muscle Use] : no accessory muscle use [Normal Rate] : normal rate  [Regular Rhythm] : with a regular rhythm [Soft] : abdomen soft [No CVA Tenderness] : no CVA  tenderness [Coordination Grossly Intact] : coordination grossly intact [Normal Insight/Judgement] : insight and judgment were intact [de-identified] : blunted affect [No Respiratory Distress] : no respiratory distress  [Normal Affect] : the affect was normal

## 2025-05-06 NOTE — HEALTH RISK ASSESSMENT
[Retired] : Retired [Never] : Never [No] : No [FreeTextEntry5] : Retired in July 2024. 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  [FreeTextEntry6] : Lives at home with her daughter and her grandson. Her  right now is in [facility].

## 2025-05-06 NOTE — HISTORY OF PRESENT ILLNESS
[FreeTextEntry1] : 70F with a pmhx significant for Afib, DM. HTN and Stage IV ampullary carcinoma diagnosed 12/2024. Current chemotherapy treatment: 3/10/25 - 4/7/25 FOLFOX 4/21/25: 5FU/LV only 4/23/25: CT CAP: Hepatic metastases, increased in size. Retroperitoneal lymphadenopathy not significantly changed. Plan now is for FOLFIRI (LEUCOVORIN, FLUOROURACIL, IRINOTECAN) every 2 weeks  Pt here today for survivorship follow up, Diabetes management.   Patient was seen today and assisted in setting up Freestyle He 2 as well as applying the sensor. She understands how to use the sensor since it is very similar to the one, she had in the past.  She will keep a log of her blood glucose reading for our next visit in two weeks.   She did not log any blood glucose readings last week as previously requested.  She had not yet picked up the prescribed glucose sensor device but reports that she plans to do so after completing today's chemotherapy session.  She states that she checked her blood glucose this morning, and the result was 348. She denies any symptoms of hyperglycemia including polydipsia polyuria headaches or blurred vision.  Of note blood glucose on most recent CMP = 305 (5/8/2025) Current DM regimen: Lantus 34 units at night.  Humalog sliding scale insulin.16 units TID with meals   Has been noticing her AM blood sugars are very elevated in the 300s.  Was on Farxiga in the past but stopped it because of urinary infections -- last took this in Oct 2024.  She was prescribed Trulicity in December 2024 -- she took it with her to [facility] but left it there when she came back here because she was diagnosed with ampullary cancer and never returned.  She never started this medication.   Is also getting dexamethasone on days 2 and 3 after chemotherapy.  Has Freestyle He 2 and 3 sensors at home.  She does not know how to use this so    Recent medical history: Diagnosed with metastatic Ampullary carcinoma in December of 2024 when she presented to the emergency department with generalized weakness, chest heaviness, jaundice, and orange urine for 1 week.  Labs at that time significant for hyperbilirubinemia, transaminitis. CT AP showed numerous new low-attenuation liver lesions most compatible with metastatic disease. Intrahepatic and extra hepatic biliary ductal dilatation to the level of the ampulla where there is abrupt tapering, as well as, pancreatic ductal dilatation up to the level of the ampulla. Distended gallbladder with pericholecystic edema. Pattern of biliary ductal dilatation raises concern for ampullary or biliary mass or malignant stricture in the setting of liver metastases. Enlarged retroperitoneal lymph node just below the takeoff of the SMA and adjacent to the third portion of the duodenum measuring up to 2.2 x 1.7 cm, presumably metastatic. Had EUS/ERCP completed on 1/30/2025 which showed malignant appearing ampullary mass with biliary obstruction. Mass was biopsied, metal stent placed. Final pathology showed ampullary mass moderately to poorly differentiated adenocarcinoma w/ intestinal, pancreaticobiliary and neuroendocrine differentiation. Liver mass biopsy positive for adenocarcinoma mucinous, papillary and signet ring cell features. 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

## 2025-05-19 NOTE — HISTORY OF PRESENT ILLNESS
[Disease: _____________________] : Disease: [unfilled] [M: ___] : M[unfilled] [AJCC Stage: ____] : AJCC Stage: [unfilled] [Therapy: ___] : Therapy: [unfilled] [Cycle: ___] : Cycle: [unfilled] [Day: ___] : Day: [unfilled] [Date: ____________] : Patient's last distress assessment performed on [unfilled]. 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   Kathleen presented to [facility] 1/22/24 c/o generalized weakness, chest heaviness, jaundice and orange urine for about a week. Labs significant for hyperbilirubinemia, transaminitis. CT AP showed numerous new low-attenuation liver lesions most compatible with metastatic disease. Intrahepatic and extra hepatic biliary ductal dilatation to the level of the ampulla where there is abrupt tapering, as well as, pancreatic ductal dilatation up to the level of the ampulla. Distended gallbladder with pericholecystic edema. Pattern of biliary ductal dilatation raises concern for ampullary or biliary mass or malignant stricture in the setting of liver metastases. Enlarged retroperitoneal lymph node just below the takeoff of the SMA and adjacent to the third portion of the duodenum measuring up to 2.2 x 1.7 cm, presumably metastatic. Case reviewed with GI, patient discharged with plans to have EUS/ERCP outpatient. Procedure performed 1/30/25 which showed malignant appearing ampullary mass with biliary obstruction. Mass was biopsied, metal stent placed. Final pathology showed ampullary mass moderately to poorly differentiated adenocarcinoma w/ intestinal, pancreaticobiliary and neuroendocrine differentiation. Liver mass biopsy positive for adenocarcinoma mucinous, papillary and signet ring cell features. 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   Referred to Medical Oncology for initial consultation.   Social: lives with her , daughter and two sons. Has 8 kids total (3 in [facility], rest here). 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  She has been following with various doctors closely. Has been c/o L sided abdominal pain for years. Had a CT A/P with co in 2022 and then had another one 1 yr ago but was a contrast study.   2/22/25: CT Chest: 3 mm solid nodule right lower lobe, 6 x 3 mm subpleural nodule right apex. Multiple bilobed hepatic lesions consistent with metastases 3/10/25: C1 FOLFOX  3/24/25: C2 4/7/25: C3  4/21/25: C4, 5FU/LV only  4/23/25: CT CAP: Hepatic metastases, increased in size. Retroperitoneal lymphadenopathy, not significantly changed. 5/5/25: C1 FOLFIRI  5/19/25: C2    [de-identified] : mod-poorly differentiated adeno intestinal, pancreaticobiliary, and NET differentiation [de-identified] : her 2 neg  MMR intact Liver bx: adeno with mucinous, papillary, signet ring cell features CPS 4  [FreeTextEntry1] : Oxaliplatin held last cycle  [de-identified] : Here for treatment  Tolerated C1 FOLFIRI well  C/o worsening neuropathy in her upper extremities over the last 2-3 days. Keeping her up at night. Taking Lyrica and Tylenol. Running out of topical emollient given by PM&R. Of note, she states she did not receive gabapentin Mirtazapine makes her drowsy. Cut in half last night Wants to go home to [facility] to  her . Would like to go for 10 days

## 2025-05-19 NOTE — REVIEW OF SYSTEMS
[Fatigue] : fatigue [Abdominal Pain] : abdominal pain [Diarrhea: Grade 0] : Diarrhea: Grade 0 [Negative] : Allergic/Immunologic [Difficulty Walking] : no difficulty walking [de-identified] : + peripheral neuropathy

## 2025-05-21 NOTE — HEALTH RISK ASSESSMENT
[Retired] : Retired [Never] : Never [No] : No [FreeTextEntry5] : Retired in July 2024. 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  [FreeTextEntry6] : Lives at home with her daughter and her grandson. Her  right now is in [facility].

## 2025-05-21 NOTE — REVIEW OF SYSTEMS
[de-identified] : She feels her face is red since chemotherapy yesturday [Itching] : no itching [Negative] : Heme/Lymph

## 2025-05-21 NOTE — REASON FOR VISIT
[Home] : at home, [unfilled] , at the time of the visit. [Telehealth (audio & video)] : This visit was provided via telehealth using real-time 2-way audio visual technology. [Verbal consent obtained from patient] : the patient, [unfilled] [Follow-Up] : a follow-up visit [Telephone (audio)] : This telephonic visit was provided via audio only technology.

## 2025-05-21 NOTE — HEALTH RISK ASSESSMENT
[Retired] : Retired [Never] : Never [No] : No [FreeTextEntry5] : Retired in July 2024. 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  [FreeTextEntry6] : Lives at home with her daughter and her grandson. Her  right now is in [facility].

## 2025-05-21 NOTE — HISTORY OF PRESENT ILLNESS
[FreeTextEntry1] : 70F with a pmhx significant for Afib, DM. HTN and Stage IV ampullary carcinoma diagnosed 12/2024. Current chemotherapy treatment: 3/10/25 - 4/7/25 FOLFOX 4/21/25: 5FU/LV only 4/23/25: CT CAP: Hepatic metastases, increased in size. Retroperitoneal lymphadenopathy not significantly changed. Plan now is for FOLFIRI (LEUCOVORIN, FLUOROURACIL, IRINOTECAN) every 2 weeks  Pt here today for survivorship follow up, Diabetes management.   Patient was seen and assisted in setting up Freestyle He 2 as well as applying the sensor. She understands how to use the sensor since it is very similar to the one, she had in the past.  She will keep a log of her blood glucose reading for our next visit in two weeks.   Today she states the machine is not reliable because the dates on the reader are in April and the sensor was placed during our last visit which was in May (2 weeks ago) She did not log any blood glucose readings last week as previously requested. She will change the sensor today and verify it is working correctly.  If not, she will do fingerstick twice a day and keep a log to review at out next visit.  She denies any symptoms of hyperglycemia including polydipsia polyuria headaches or blurred vision.  Of note blood glucose on most recent CMP = 305 (5/8/2025) Current DM regimen: Lantus 34 units at night.  Humalog sliding scale insulin.16 units TID with meals   Has been noticing her AM blood sugars are very elevated in the 300s.  Was on Farxiga in the past but stopped it because of urinary infections -- last took this in Oct 2024.  She was prescribed Trulicity in December 2024 -- she took it with her to [facility] but left it there when she came back here because she was diagnosed with ampullary cancer and never returned.  She never started this medication.   Is also getting dexamethasone on days 2 and 3 after chemotherapy.  Has Freestyle He 2 and 3 sensors at home.  She does not know how to use this so    Recent medical history: Diagnosed with metastatic Ampullary carcinoma in December of 2024 when she presented to the emergency department with generalized weakness, chest heaviness, jaundice, and orange urine for 1 week.  Labs at that time significant for hyperbilirubinemia, transaminitis. CT AP showed numerous new low-attenuation liver lesions most compatible with metastatic disease. Intrahepatic and extra hepatic biliary ductal dilatation to the level of the ampulla where there is abrupt tapering, as well as, pancreatic ductal dilatation up to the level of the ampulla. Distended gallbladder with pericholecystic edema. Pattern of biliary ductal dilatation raises concern for ampullary or biliary mass or malignant stricture in the setting of liver metastases. Enlarged retroperitoneal lymph node just below the takeoff of the SMA and adjacent to the third portion of the duodenum measuring up to 2.2 x 1.7 cm, presumably metastatic. Had EUS/ERCP completed on 1/30/2025 which showed malignant appearing ampullary mass with biliary obstruction. Mass was biopsied, metal stent placed. Final pathology showed ampullary mass moderately to poorly differentiated adenocarcinoma w/ intestinal, pancreaticobiliary and neuroendocrine differentiation. Liver mass biopsy positive for adenocarcinoma mucinous, papillary and signet ring cell features. 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

## 2025-05-21 NOTE — HISTORY OF PRESENT ILLNESS
[FreeTextEntry1] : 70F with a pmhx significant for Afib, DM. HTN and Stage IV ampullary carcinoma diagnosed 12/2024. Current chemotherapy treatment: 3/10/25 - 4/7/25 FOLFOX 4/21/25: 5FU/LV only 4/23/25: CT CAP: Hepatic metastases, increased in size. Retroperitoneal lymphadenopathy not significantly changed. Plan now is for FOLFIRI (LEUCOVORIN, FLUOROURACIL, IRINOTECAN) every 2 weeks  Pt here today for survivorship follow up, Diabetes management.   Patient was seen and assisted in setting up Freestyle He 2 as well as applying the sensor. She understands how to use the sensor since it is very similar to the one, she had in the past.  She will keep a log of her blood glucose reading for our next visit in two weeks.   Today she states the machine is not reliable because the dates on the reader are in April and the sensor was placed during our last visit which was in May (2 weeks ago) She did not log any blood glucose readings last week as previously requested. She will change the sensor today and verify it is working correctly.  If not, she will do fingerstick twice a day and keep a log to review at out next visit.  She denies any symptoms of hyperglycemia including polydipsia polyuria headaches or blurred vision.  Of note blood glucose on most recent CMP = 305 (5/8/2025) Current DM regimen: Lantus 34 units at night.  Humalog sliding scale insulin.16 units TID with meals   Has been noticing her AM blood sugars are very elevated in the 300s.  Was on Farxiga in the past but stopped it because of urinary infections -- last took this in Oct 2024.  She was prescribed Trulicity in December 2024 -- she took it with her to [facility] but left it there when she came back here because she was diagnosed with ampullary cancer and never returned.  She never started this medication.   Is also getting dexamethasone on days 2 and 3 after chemotherapy.  Has Freestyle He 2 and 3 sensors at home.  She does not know how to use this so    Recent medical history: Diagnosed with metastatic Ampullary carcinoma in December of 2024 when she presented to the emergency department with generalized weakness, chest heaviness, jaundice, and orange urine for 1 week.  Labs at that time significant for hyperbilirubinemia, transaminitis. CT AP showed numerous new low-attenuation liver lesions most compatible with metastatic disease. Intrahepatic and extra hepatic biliary ductal dilatation to the level of the ampulla where there is abrupt tapering, as well as, pancreatic ductal dilatation up to the level of the ampulla. Distended gallbladder with pericholecystic edema. Pattern of biliary ductal dilatation raises concern for ampullary or biliary mass or malignant stricture in the setting of liver metastases. Enlarged retroperitoneal lymph node just below the takeoff of the SMA and adjacent to the third portion of the duodenum measuring up to 2.2 x 1.7 cm, presumably metastatic. Had EUS/ERCP completed on 1/30/2025 which showed malignant appearing ampullary mass with biliary obstruction. Mass was biopsied, metal stent placed. Final pathology showed ampullary mass moderately to poorly differentiated adenocarcinoma w/ intestinal, pancreaticobiliary and neuroendocrine differentiation. Liver mass biopsy positive for adenocarcinoma mucinous, papillary and signet ring cell features. 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

## 2025-05-21 NOTE — REVIEW OF SYSTEMS
[de-identified] : She feels her face is red since chemotherapy yesturday [Itching] : no itching [Negative] : Heme/Lymph

## 2025-06-09 NOTE — PHYSICAL EXAM
[No Acute Distress] : no acute distress [Well Nourished] : well nourished [No Respiratory Distress] : no respiratory distress  [Normal Affect] : the affect was normal [Normal Insight/Judgement] : insight and judgment were intact [Normal Sclera/Conjunctiva] : normal sclera/conjunctiva [Normal Outer Ear/Nose] : the outer ears and nose were normal in appearance [No Accessory Muscle Use] : no accessory muscle use [Soft] : abdomen soft [Non Tender] : non-tender [Non-distended] : non-distended [Normal Bowel Sounds] : normal bowel sounds [No Joint Swelling] : no joint swelling [Coordination Grossly Intact] : coordination grossly intact [Speech Grossly Normal] : speech grossly normal

## 2025-06-09 NOTE — HISTORY OF PRESENT ILLNESS
[FreeTextEntry1] : 70F with a pmhx significant for Afib, DM. HTN and Stage IV ampullary carcinoma diagnosed 12/2024. Current chemotherapy treatment: 3/10/25 - 4/7/25 FOLFOX 4/21/25: 5FU/LV only 4/23/25: CT CAP: Hepatic metastases, increased in size. Retroperitoneal lymphadenopathy not significantly changed. Plan now is for FOLFIRI (LEUCOVORIN, FLUOROURACIL, IRINOTECAN) every 2 weeks  Pt here today for survivorship follow up, Diabetes management.   She recently traveled to [facility] for two weeks and enjoyed her time there with her .  She finds returning to [facility] to be very relaxing and hopes to go back soon. She feels significant stress particularly over her neck and shoulders.  She takes Pregabalin, which she feels helps alleviate the tension.   Patient had been assisted in setting up [employer] He 2 as well as applying the sensor 5/6/2025  It was requested she keep a log of her blood glucose reading for our next visit in two weeks.   During our last visit she mentioned the machine was not reliable because the dates on the reader are in April and the sensor was placed during our last  visit which was 5/21/25 Today she mentions that after our last visit, she realized she had been using the incorrect sensor, which was why she was not getting readings.  As a result, she decided to switch to traditional glucometer with finger sticks.  While she was visiting [facility] for two weeks, a friend provided her with a glucometer that she has been using since.  She reports that most of her glucose readings have been in the 100's - (105, 146, 118, 65), except for ne reading of 515 on her chemotherapy day.  She did not keep a log of blood glucose readings as previously requested. She denies any symptoms of hyperglycemia including polydipsia polyuria headaches or blurred vision. Of note blood glucose on most recent CMP = 305 (5/8/2025)  Current DM regimen: Lantus 34 units at night.  Humalog sliding scale insulin.16 units TID with meals    Was on Farxiga in the past but stopped it because of urinary infections -- last took this in Oct 2024.  She was prescribed Trulicity in December 2024 -- she took it with her to [facility] but left it there when she came back here because she was diagnosed with ampullary cancer and never returned.  She never started this medication.   Is also getting dexamethasone on days 2 and 3 after chemotherapy.  She will be receiving Chemotherapy on Monday 6/16/25   Recent medical history: Diagnosed with metastatic Ampullary carcinoma in December of 2024 when she presented to the emergency department with generalized weakness, chest heaviness, jaundice, and orange urine for 1 week.  Labs at that time significant for hyperbilirubinemia, transaminitis. CT AP showed numerous new low-attenuation liver lesions most compatible with metastatic disease. Intrahepatic and extra hepatic biliary ductal dilatation to the level of the ampulla where there is abrupt tapering, as well as, pancreatic ductal dilatation up to the level of the ampulla. Distended gallbladder with pericholecystic edema. Pattern of biliary ductal dilatation raises concern for ampullary or biliary mass or malignant stricture in the setting of liver metastases. Enlarged retroperitoneal lymph node just below the takeoff of the SMA and adjacent to the third portion of the duodenum measuring up to 2.2 x 1.7 cm, presumably metastatic. Had EUS/ERCP completed on 1/30/2025 which showed malignant appearing ampullary mass with biliary obstruction. Mass was biopsied, metal stent placed. Final pathology showed ampullary mass moderately to poorly differentiated adenocarcinoma w/ intestinal, pancreaticobiliary and neuroendocrine differentiation. Liver mass biopsy positive for adenocarcinoma mucinous, papillary and signet ring cell features. 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

## 2025-06-09 NOTE — HEALTH RISK ASSESSMENT
[Retired] : Retired [Never] : Never [No] : No [FreeTextEntry5] : Retired in July 2024. 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  [FreeTextEntry6] : Lives at home with her daughter and her grandson. Her  right now is in [facility].

## 2025-06-09 NOTE — PLAN
[Survivorship] : Survivorship [FreeTextEntry1] : Ampullary carcinoma - FOLFIRI (LEUCOVORIN, FLUOROURACIL, IRINOTECAN) every 2 weeks [FreeTextEntry8] : DM Lantus 34 units QAM Humalog 16 units TID meals

## 2025-06-18 NOTE — HEALTH RISK ASSESSMENT
[FreeTextEntry5] : Retired in July 2024. 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  [FreeTextEntry6] : Lives at home with her daughter and her grandson. Her  right now is in [facility].

## 2025-06-18 NOTE — PLAN
[FreeTextEntry1] : Ampullary carcinoma - FOLFIRI (LEUCOVORIN, FLUOROURACIL, IRINOTECAN) every 2 weeks [FreeTextEntry8] : DM Lantus 40 units QAM Humalog 16 units TID meals

## 2025-06-18 NOTE — HISTORY OF PRESENT ILLNESS
[FreeTextEntry1] : 70F with a pmhx significant for Afib, DM. HTN and Stage IV ampullary carcinoma diagnosed 12/2024. Current chemotherapy treatment: 3/10/25 - 4/7/25 FOLFOX 4/21/25: 5FU/LV only 4/23/25: CT CAP: Hepatic metastases, increased in size. Retroperitoneal lymphadenopathy not significantly changed. Plan now is for FOLFIRI (LEUCOVORIN, FLUOROURACIL, IRINOTECAN) every 2 weeks  Pt here today for survivorship follow up, Diabetes management.  She is using traditional blood glucose monitor with lancets and test strip instead of sensor device. She had chemotherapy on Monday 6/16/25 Finger stick - Blood glucose: Saturday 308, 248 PM Sunday 310 Monday (chemo day) 248 AM, 519 PM Tuesday 348 AM, 312 PM Wednesday (today) 318 AM  She recently traveled to [facility] for two weeks and enjoyed her time there with her .  She finds returning to [facility] to be very relaxing and hopes to go back soon. She feels significant stress particularly over her neck and shoulders.  She takes Pregabalin, which she feels helps alleviate the tension.   Patient had been assisted in setting up [employer] He 2 as well as applying the sensor 5/6/2025 - She prefers the traditional finger stick device. It was requested she keep a log of her blood glucose reading for our next visit in two weeks.   During our last visit she mentioned the machine was not reliable because the dates on the reader are in April and the sensor was placed during our last  visit which was 5/21/25 Today she mentions that after our last visit, she realized she had been using the incorrect sensor, which was why she was not getting readings.  As a result, she decided to switch to traditional glucometer with finger sticks.  While she was visiting [facility] for two weeks, a friend provided her with a glucometer that she has been using since.  She reports that most of her glucose readings have been in the 100's - (105, 146, 118, 65), except for ne reading of 515 on her chemotherapy day.  She did not keep a log of blood glucose readings as previously requested. She denies any symptoms of hyperglycemia including polydipsia polyuria headaches or blurred vision. Of note blood glucose on most recent CMP = 305 (5/8/2025)  Current DM regimen: Lantus 34 units at night. Humalog sliding scale insulin.16 units TID with meals    Was on Farxiga in the past but stopped it because of urinary infections -- last took this in Oct 2024.  She was prescribed Trulicity in December 2024 -- she took it with her to [facility] but left it there when she came back here because she was diagnosed with ampullary cancer and never returned.  She never started this medication.   Is also getting dexamethasone on days 2 and 3 after chemotherapy.  She will be receiving Chemotherapy on Monday 6/16/25   Recent medical history: Diagnosed with metastatic Ampullary carcinoma in December of 2024 when she presented to the emergency department with generalized weakness, chest heaviness, jaundice, and orange urine for 1 week.  Labs at that time significant for hyperbilirubinemia, transaminitis. CT AP showed numerous new low-attenuation liver lesions most compatible with metastatic disease. Intrahepatic and extra hepatic biliary ductal dilatation to the level of the ampulla where there is abrupt tapering, as well as, pancreatic ductal dilatation up to the level of the ampulla. Distended gallbladder with pericholecystic edema. Pattern of biliary ductal dilatation raises concern for ampullary or biliary mass or malignant stricture in the setting of liver metastases. Enlarged retroperitoneal lymph node just below the takeoff of the SMA and adjacent to the third portion of the duodenum measuring up to 2.2 x 1.7 cm, presumably metastatic. Had EUS/ERCP completed on 1/30/2025 which showed malignant appearing ampullary mass with biliary obstruction. Mass was biopsied, metal stent placed. Final pathology showed ampullary mass moderately to poorly differentiated adenocarcinoma w/ intestinal, pancreaticobiliary and neuroendocrine differentiation. Liver mass biopsy positive for adenocarcinoma mucinous, papillary and signet ring cell features. 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

## 2025-06-18 NOTE — HEALTH RISK ASSESSMENT
[FreeTextEntry5] : Retired in July 2024. 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  [FreeTextEntry6] : Lives at home with her daughter and her grandson. Her  right now is in [facility].

## 2025-06-18 NOTE — HISTORY OF PRESENT ILLNESS
[FreeTextEntry1] : 70F with a pmhx significant for Afib, DM. HTN and Stage IV ampullary carcinoma diagnosed 12/2024. Current chemotherapy treatment: 3/10/25 - 4/7/25 FOLFOX 4/21/25: 5FU/LV only 4/23/25: CT CAP: Hepatic metastases, increased in size. Retroperitoneal lymphadenopathy not significantly changed. Plan now is for FOLFIRI (LEUCOVORIN, FLUOROURACIL, IRINOTECAN) every 2 weeks  Pt here today for survivorship follow up, Diabetes management.  She is using traditional blood glucose monitor with lancets and test strip instead of sensor device. She had chemotherapy on Monday 6/16/25 Finger stick - Blood glucose: Saturday 308, 248 PM Sunday 310 Monday (chemo day) 248 AM, 519 PM Tuesday 348 AM, 312 PM Wednesday (today) 318 AM  She recently traveled to [facility] for two weeks and enjoyed her time there with her .  She finds returning to [facility] to be very relaxing and hopes to go back soon. She feels significant stress particularly over her neck and shoulders.  She takes Pregabalin, which she feels helps alleviate the tension.   Patient had been assisted in setting up [employer] He 2 as well as applying the sensor 5/6/2025 - She prefers the traditional finger stick device. It was requested she keep a log of her blood glucose reading for our next visit in two weeks.   During our last visit she mentioned the machine was not reliable because the dates on the reader are in April and the sensor was placed during our last  visit which was 5/21/25 Today she mentions that after our last visit, she realized she had been using the incorrect sensor, which was why she was not getting readings.  As a result, she decided to switch to traditional glucometer with finger sticks.  While she was visiting [facility] for two weeks, a friend provided her with a glucometer that she has been using since.  She reports that most of her glucose readings have been in the 100's - (105, 146, 118, 65), except for ne reading of 515 on her chemotherapy day.  She did not keep a log of blood glucose readings as previously requested. She denies any symptoms of hyperglycemia including polydipsia polyuria headaches or blurred vision. Of note blood glucose on most recent CMP = 305 (5/8/2025)  Current DM regimen: Lantus 34 units at night. Humalog sliding scale insulin.16 units TID with meals    Was on Farxiga in the past but stopped it because of urinary infections -- last took this in Oct 2024.  She was prescribed Trulicity in December 2024 -- she took it with her to [facility] but left it there when she came back here because she was diagnosed with ampullary cancer and never returned.  She never started this medication.   Is also getting dexamethasone on days 2 and 3 after chemotherapy.  She will be receiving Chemotherapy on Monday 6/16/25   Recent medical history: Diagnosed with metastatic Ampullary carcinoma in December of 2024 when she presented to the emergency department with generalized weakness, chest heaviness, jaundice, and orange urine for 1 week.  Labs at that time significant for hyperbilirubinemia, transaminitis. CT AP showed numerous new low-attenuation liver lesions most compatible with metastatic disease. Intrahepatic and extra hepatic biliary ductal dilatation to the level of the ampulla where there is abrupt tapering, as well as, pancreatic ductal dilatation up to the level of the ampulla. Distended gallbladder with pericholecystic edema. Pattern of biliary ductal dilatation raises concern for ampullary or biliary mass or malignant stricture in the setting of liver metastases. Enlarged retroperitoneal lymph node just below the takeoff of the SMA and adjacent to the third portion of the duodenum measuring up to 2.2 x 1.7 cm, presumably metastatic. Had EUS/ERCP completed on 1/30/2025 which showed malignant appearing ampullary mass with biliary obstruction. Mass was biopsied, metal stent placed. Final pathology showed ampullary mass moderately to poorly differentiated adenocarcinoma w/ intestinal, pancreaticobiliary and neuroendocrine differentiation. Liver mass biopsy positive for adenocarcinoma mucinous, papillary and signet ring cell features. 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

## 2025-07-02 NOTE — DATA REVIEWED
[FreeTextEntry1] : 6/2025 labs: WBC: 4.74 Hgb: 11.7 Plt: 251 Neutrophil #: 2.89  7/2025 labs: Creatinine: 0.54 AST: 22 ALT: 22  4/2025 CT C/A/P: Hepatic metastases, increased in size.  Retroperitoneal lymphadenopathy, not significantly changed.  4/2025 Bilateral LE duplex: No evidence of deep venous thrombosis in either lower extremity.

## 2025-07-02 NOTE — ASSESSMENT
[FreeTextEntry1] : 70-year-old female here for follow-up:  #Neuropathy -Continue nerve-targeted topical prescription cream: Refill placed -She is on Lyrica 50 mg BID: Refill placed I-stop:[id number] -Previously discussed about PT to work on balance and gait: She prefers [facility], but it was denied: Declined outpatient therapy -Previously discussed about medical marijuana: Declined  #Decreased appetite with a globus sensation  -Previously discussed about medical marijuana: Declined  #History of ampullary cancer -Recommend 150-300 minutes of moderate intensity aerobic exercise and 2-3 strength trainings per week.  Follow-up in 8 weeks  Spent a total of 30 minutes including but not limited to preparing for the visit, obtaining history, performing physical exam, ordering medication, documenting and educating the patient.

## 2025-07-02 NOTE — HISTORY OF PRESENT ILLNESS
[FreeTextEntry1] : Ms. Kathleen Armendariz is a 70-year-old female with history of HTN, T2DM, A-fib on Eliquis and ampullary cancer s/p EUS/ERCP with metal stent placement on 1/30/25. Started on FOLFOX on 3/10/25. She was referred by Dr. Melba Lozada for assistance with neuropathy.   Endorses fatigue during chemotherapy. She tries to stay active.   She finds the cream and Lyrica helpful.  She isn't doing therapy. She no longer feels like a ring is surrounding her throat ever since she started therapy. She did not see SLP.

## 2025-07-02 NOTE — DATA REVIEWED
[FreeTextEntry1] : 6/2025 labs: WBC: 4.74 Hgb: 11.7 Plt: 251 Neutrophil #: 2.89  7/2025 labs: Creatinine: 0.54 AST: 22 ALT: 22  4/2025 CT C/A/P: Hepatic metastases, increased in size.  Retroperitoneal lymphadenopathy, not significantly changed.  4/2025 Bilateral LE duplex: No evidence of deep venous thrombosis in either lower extremity.  normal (ped)...

## 2025-07-02 NOTE — ASSESSMENT
[FreeTextEntry1] : 70-year-old female here for follow-up:  #Neuropathy -Continue nerve-targeted topical prescription cream: Refill placed -She is on Lyrica 50 mg BID: Refill placed I-stop:[id number] -Previously discussed about PT to work on balance and gait: She prefers [facility], but it was denied: Declined outpatient therapy -Previously discussed about medical marijuana: Declined  #Decreased appetite with a globus sensation  -Previously discussed about medical marijuana: Declined  #History of ampullary cancer -Recommend 150-300 minutes of moderate intensity aerobic exercise and 2-3 strength trainings per week.  Follow-up in 8 weeks  Spent a total of 30 minutes including but not limited to preparing for the visit, obtaining history, performing physical exam, ordering medication, documenting and educating the patient.

## 2025-07-14 NOTE — REVIEW OF SYSTEMS
[Fatigue] : fatigue [Abdominal Pain] : abdominal pain [Diarrhea: Grade 0] : Diarrhea: Grade 0 [Negative] : Allergic/Immunologic [Difficulty Walking] : no difficulty walking [FreeTextEntry7] : +dyspepsia  [de-identified] : + peripheral neuropathy

## 2025-07-14 NOTE — HISTORY OF PRESENT ILLNESS
[Disease: _____________________] : Disease: [unfilled] [M: ___] : M[unfilled] [AJCC Stage: ____] : AJCC Stage: [unfilled] [Therapy: ___] : Therapy: [unfilled] [Cycle: ___] : Cycle: [unfilled] [Day: ___] : Day: [unfilled] [Date: ____________] : Patient's last distress assessment performed on [unfilled]. 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   Kathleen presented to [place] ED 1/22/24 c/o generalized weakness, chest heaviness, jaundice and orange urine for about a week. Labs significant for hyperbilirubinemia, transaminitis. CT AP showed numerous new low-attenuation liver lesions most compatible with metastatic disease. Intrahepatic and extra hepatic biliary ductal dilatation to the level of the ampulla where there is abrupt tapering, as well as, pancreatic ductal dilatation up to the level of the ampulla. Distended gallbladder with pericholecystic edema. Pattern of biliary ductal dilatation raises concern for ampullary or biliary mass or malignant stricture in the setting of liver metastases. Enlarged retroperitoneal lymph node just below the takeoff of the SMA and adjacent to the third portion of the duodenum measuring up to 2.2 x 1.7 cm, presumably metastatic. Case reviewed with GI, patient discharged with plans to have EUS/ERCP outpatient. Procedure performed 1/30/25 which showed malignant appearing ampullary mass with biliary obstruction. Mass was biopsied, metal stent placed. Final pathology showed ampullary mass moderately to poorly differentiated adenocarcinoma w/ intestinal, pancreaticobiliary and neuroendocrine differentiation. Liver mass biopsy positive for adenocarcinoma mucinous, papillary and signet ring cell features. 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   Referred to Medical Oncology for initial consultation.   Social: lives with her , daughter and two sons. Has 8 kids total (3 in [facility], rest here). 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  She has been following with various doctors closely. Has been c/o L sided abdominal pain for years. Had a CT A/P with co in 2022 and then had another one 1 yr ago but was a contrast study.   2/22/25: CT Chest: 3 mm solid nodule right lower lobe, 6 x 3 mm subpleural nodule right apex. Multiple bilobed hepatic lesions consistent with metastases 3/10/25: C1 FOLFOX  3/24/25: C2 4/7/25: C3  4/21/25: C4, 5FU/LV only  4/23/25: CT CAP: Hepatic metastases, increased in size. Retroperitoneal lymphadenopathy, not significantly changed. 5/5/25: C1 FOLFIRI  5/19/25: C2  6/16/25: C3, delayed d/t travel  6/30/35: C4 7/14/25: C5  [de-identified] : mod-poorly differentiated adeno intestinal, pancreaticobiliary, and NET differentiation [de-identified] : her 2 neg  MMR intact Liver bx: adeno with mucinous, papillary, signet ring cell features CPS 4  [de-identified] : Has had a good 4 days  Neuropathy is her hand feels better. She had one bad day with her feet since last visit Has been checking BS at home & writing down. Some days down in 100-200s range. Increased lantus at night 46U as recommended  C/o abdominal cramping. Improved once she started pantoprazole.

## 2025-07-28 NOTE — REVIEW OF SYSTEMS
[Fatigue] : fatigue [Abdominal Pain] : abdominal pain [Diarrhea: Grade 0] : Diarrhea: Grade 0 [Negative] : Allergic/Immunologic [FreeTextEntry7] : +dyspepsia  [Recent Change In Weight] : ~T recent weight change [Difficulty Walking] : no difficulty walking [de-identified] : + peripheral neuropathy

## 2025-07-28 NOTE — HISTORY OF PRESENT ILLNESS
[Disease: _____________________] : Disease: [unfilled] [M: ___] : M[unfilled] [AJCC Stage: ____] : AJCC Stage: [unfilled] [Therapy: ___] : Therapy: [unfilled] [Cycle: ___] : Cycle: [unfilled] [Day: ___] : Day: [unfilled] [Date: ____________] : Patient's last distress assessment performed on [unfilled]. 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   Kathleen presented to [place] ED 1/22/24 c/o generalized weakness, chest heaviness, jaundice and orange urine for about a week. Labs significant for hyperbilirubinemia, transaminitis. CT AP showed numerous new low-attenuation liver lesions most compatible with metastatic disease. Intrahepatic and extra hepatic biliary ductal dilatation to the level of the ampulla where there is abrupt tapering, as well as, pancreatic ductal dilatation up to the level of the ampulla. Distended gallbladder with pericholecystic edema. Pattern of biliary ductal dilatation raises concern for ampullary or biliary mass or malignant stricture in the setting of liver metastases. Enlarged retroperitoneal lymph node just below the takeoff of the SMA and adjacent to the third portion of the duodenum measuring up to 2.2 x 1.7 cm, presumably metastatic. Case reviewed with GI, patient discharged with plans to have EUS/ERCP outpatient. Procedure performed 1/30/25 which showed malignant appearing ampullary mass with biliary obstruction. Mass was biopsied, metal stent placed. Final pathology showed ampullary mass moderately to poorly differentiated adenocarcinoma w/ intestinal, pancreaticobiliary and neuroendocrine differentiation. Liver mass biopsy positive for adenocarcinoma mucinous, papillary and signet ring cell features. 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   Referred to Medical Oncology for initial consultation.   Social: lives with her , daughter and two sons. Has 8 kids total (3 in [facility], rest here). 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  She has been following with various doctors closely. Has been c/o L sided abdominal pain for years. Had a CT A/P with co in 2022 and then had another one 1 yr ago but was a contrast study.   2/22/25: CT Chest: 3 mm solid nodule right lower lobe, 6 x 3 mm subpleural nodule right apex. Multiple bilobed hepatic lesions consistent with metastases 3/10/25: C1 FOLFOX  3/24/25: C2 4/7/25: C3  4/21/25: C4, 5FU/LV only  4/23/25: CT CAP: Hepatic metastases, increased in size. Retroperitoneal lymphadenopathy, not significantly changed. 5/5/25: C1 FOLFIRI  5/19/25: C2  6/16/25: C3, delayed d/t travel  6/30/35: C4 7/14/25: C5 7/28/25: C6  [de-identified] : mod-poorly differentiated adeno intestinal, pancreaticobiliary, and NET differentiation [de-identified] : her 2 neg  MMR intact Liver bx: adeno with mucinous, papillary, signet ring cell features CPS 4  [de-identified] : has not been feeling well with this treatment.  + fatigue, improves with rest  diffuse abdominal pain x 2 weeks. can't describe it any further. Tylenol helps. does not want anything stronger no nausea, takes antiemetic no diarrhea  moves BMs daily  lots 10 lbs since May. 7 bs in the last 2 weeks. Appetite is not always good but family encourages her to eat.  Does not take remeron - does not remember if it helped her back in march when it was first prescribed.

## 2025-07-28 NOTE — REVIEW OF SYSTEMS
[Fatigue] : fatigue [Abdominal Pain] : abdominal pain [Diarrhea: Grade 0] : Diarrhea: Grade 0 [Negative] : Allergic/Immunologic [FreeTextEntry7] : +dyspepsia  [Recent Change In Weight] : ~T recent weight change [Difficulty Walking] : no difficulty walking [de-identified] : + peripheral neuropathy

## 2025-07-28 NOTE — HISTORY OF PRESENT ILLNESS
[Disease: _____________________] : Disease: [unfilled] [M: ___] : M[unfilled] [AJCC Stage: ____] : AJCC Stage: [unfilled] [Therapy: ___] : Therapy: [unfilled] [Cycle: ___] : Cycle: [unfilled] [Day: ___] : Day: [unfilled] [Date: ____________] : Patient's last distress assessment performed on [unfilled]. 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   Kathleen presented to [place] ED 1/22/24 c/o generalized weakness, chest heaviness, jaundice and orange urine for about a week. Labs significant for hyperbilirubinemia, transaminitis. CT AP showed numerous new low-attenuation liver lesions most compatible with metastatic disease. Intrahepatic and extra hepatic biliary ductal dilatation to the level of the ampulla where there is abrupt tapering, as well as, pancreatic ductal dilatation up to the level of the ampulla. Distended gallbladder with pericholecystic edema. Pattern of biliary ductal dilatation raises concern for ampullary or biliary mass or malignant stricture in the setting of liver metastases. Enlarged retroperitoneal lymph node just below the takeoff of the SMA and adjacent to the third portion of the duodenum measuring up to 2.2 x 1.7 cm, presumably metastatic. Case reviewed with GI, patient discharged with plans to have EUS/ERCP outpatient. Procedure performed 1/30/25 which showed malignant appearing ampullary mass with biliary obstruction. Mass was biopsied, metal stent placed. Final pathology showed ampullary mass moderately to poorly differentiated adenocarcinoma w/ intestinal, pancreaticobiliary and neuroendocrine differentiation. Liver mass biopsy positive for adenocarcinoma mucinous, papillary and signet ring cell features. 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   Referred to Medical Oncology for initial consultation.   Social: lives with her , daughter and two sons. Has 8 kids total (3 in [facility], rest here). 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  She has been following with various doctors closely. Has been c/o L sided abdominal pain for years. Had a CT A/P with co in 2022 and then had another one 1 yr ago but was a contrast study.   2/22/25: CT Chest: 3 mm solid nodule right lower lobe, 6 x 3 mm subpleural nodule right apex. Multiple bilobed hepatic lesions consistent with metastases 3/10/25: C1 FOLFOX  3/24/25: C2 4/7/25: C3  4/21/25: C4, 5FU/LV only  4/23/25: CT CAP: Hepatic metastases, increased in size. Retroperitoneal lymphadenopathy, not significantly changed. 5/5/25: C1 FOLFIRI  5/19/25: C2  6/16/25: C3, delayed d/t travel  6/30/35: C4 7/14/25: C5 7/28/25: C6  [de-identified] : mod-poorly differentiated adeno intestinal, pancreaticobiliary, and NET differentiation [de-identified] : her 2 neg  MMR intact Liver bx: adeno with mucinous, papillary, signet ring cell features CPS 4  [de-identified] : has not been feeling well with this treatment.  + fatigue, improves with rest  diffuse abdominal pain x 2 weeks. can't describe it any further. Tylenol helps. does not want anything stronger no nausea, takes antiemetic no diarrhea  moves BMs daily  lots 10 lbs since May. 7 bs in the last 2 weeks. Appetite is not always good but family encourages her to eat.  Does not take remeron - does not remember if it helped her back in march when it was first prescribed.